# Patient Record
Sex: FEMALE | Race: WHITE | NOT HISPANIC OR LATINO | URBAN - METROPOLITAN AREA
[De-identification: names, ages, dates, MRNs, and addresses within clinical notes are randomized per-mention and may not be internally consistent; named-entity substitution may affect disease eponyms.]

---
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
## 2022-04-02 ASSESSMENT — VISUAL ACUITY
OD_SC: 20/20
OS_SC: 20/20

## 2022-04-02 ASSESSMENT — TONOMETRY
OS_IOP_MMHG: 14
OD_IOP_MMHG: 16

## 2023-03-15 ENCOUNTER — LAB OUTSIDE AN ENCOUNTER (OUTPATIENT)
Dept: URBAN - METROPOLITAN AREA CLINIC 60 | Facility: CLINIC | Age: 23
End: 2023-03-15

## 2023-03-15 ENCOUNTER — OFFICE VISIT (OUTPATIENT)
Dept: URBAN - METROPOLITAN AREA CLINIC 60 | Facility: CLINIC | Age: 23
End: 2023-03-15
Payer: COMMERCIAL

## 2023-03-15 VITALS
OXYGEN SATURATION: 98 % | BODY MASS INDEX: 20.44 KG/M2 | SYSTOLIC BLOOD PRESSURE: 106 MMHG | TEMPERATURE: 98.1 F | WEIGHT: 127.2 LBS | HEART RATE: 114 BPM | HEIGHT: 66 IN | DIASTOLIC BLOOD PRESSURE: 68 MMHG

## 2023-03-15 DIAGNOSIS — K58.2 IRRITABLE BOWEL SYNDROME WITH ALTERNATING BOWEL HABITS: ICD-10-CM

## 2023-03-15 DIAGNOSIS — R10.84 GENERALIZED ABDOMINAL PAIN: ICD-10-CM

## 2023-03-15 DIAGNOSIS — R11.0 NAUSEA: ICD-10-CM

## 2023-03-15 DIAGNOSIS — R14.0 ABDOMINAL BLOATING: ICD-10-CM

## 2023-03-15 PROBLEM — 440544005: Status: ACTIVE | Noted: 2023-03-15

## 2023-03-15 PROCEDURE — 99204 OFFICE O/P NEW MOD 45 MIN: CPT | Performed by: PHYSICIAN ASSISTANT

## 2023-03-15 RX ORDER — PROMETHAZINE HYDROCHLORIDE 12.5 MG/1
TABLET ORAL
Qty: 30 EACH | Status: ACTIVE | COMMUNITY

## 2023-03-15 RX ORDER — ONDANSETRON 8 MG/1
1 TABLET ON THE TONGUE AND ALLOW TO DISSOLVE AS NEEDED TABLET, ORALLY DISINTEGRATING ORAL ONCE A DAY
Qty: 30 | Refills: 3 | OUTPATIENT
Start: 2023-03-15

## 2023-03-15 RX ORDER — METOCLOPRAMIDE 10 MG/1
TABLET ORAL
Qty: 28 EACH | Status: ACTIVE | COMMUNITY

## 2023-03-15 RX ORDER — MECLIZINE HCL 12.5 MG 12.5 MG/1
1 TABLET AS NEEDED TABLET ORAL
Qty: 60 TABLET | Refills: 0 | OUTPATIENT
Start: 2023-03-15

## 2023-03-15 RX ORDER — ONDANSETRON 4 MG/1
TABLET, ORALLY DISINTEGRATING ORAL
Qty: 24 EACH | Status: ACTIVE | COMMUNITY

## 2023-03-15 RX ORDER — DICYCLOMINE HYDROCHLORIDE 20 MG/1
1 TABLET TABLET ORAL THREE TIMES A DAY
Qty: 90 | Refills: 3 | OUTPATIENT
Start: 2023-03-15 | End: 2023-04-14

## 2023-03-15 NOTE — HPI-TODAY'S VISIT:
22-year-old female presents to the office for evaluation of nausea.  She presents to the office today reporting constant nausea over the past 6 weeks associated with dizziness.  She is using Zofran as needed but needs refills.  She is not vomiting.  States she has been eating a bland diet but is not able to eat much because of her nausea.  Urine pregnancy test in the ER a few days ago was negative.  She has chronic multifocal abdominal cramping, belching and alternating bowel habits between constipation and diarrhea.  She is currently having constipation.  She had a bowel movement yesterday but before that had not had a bowel movement in 7 days.  Last month she was having diarrhea.  She denies any blood in the stool.  No unintentional weight loss.  Symptoms have been going on for years.   No prior EGD or colonoscopy.  No NSAID use.  Her father's cousin has Crohn's disease.  No family history of GI malignancy.  She is currently home on spring break and is requesting that she have an expedited work-up within the next 2 days.  She was recently seen in the ER in [place] on March 9, 2023 with complaints of abdominal pain and nausea.  Labs were done including CBC and CMP which were unremarkable.  CT scan of the abdomen and pelvis was done with contrast which demonstrated no acute abnormality.  She had previously been seen by GI in February 2023 in [place].  Her CBC, CMP, TSH were normal in February.  CRP was normal at 0.7.  Sed rate was normal at 9.  Celiac panel was done and was negative.  She was  prescribed Bentyl 20 mg 4 times daily as needed and Zofran to use as needed for nausea.  She has an order with her for stool studies to complete.  She previously followed with [facility].  She was last seen by them in August 2020 with complaints of chronic diarrhea which had been going on for 2 years associated with lower abdominal cramping, bloating, and excessive belching.  She reported a history of lactose intolerance.  Diarrhea would occur 4 times a month on average.  Her symptoms were thought to be functional.  Colonoscopy was discussed with the patient but she declined.  Labs, stool studies and abdominal ultrasound which were all negative/normal.  Celiac panel was done and was negative.  Stool was negative for H. pylori antigen and C. difficile.  Her ultrasound was not completed.  KUB was also ordered which was not completed.

## 2023-03-20 ENCOUNTER — OFFICE VISIT (OUTPATIENT)
Dept: URBAN - METROPOLITAN AREA MEDICAL CENTER 3 | Facility: MEDICAL CENTER | Age: 23
End: 2023-03-20
Payer: COMMERCIAL

## 2023-03-20 ENCOUNTER — TELEPHONE ENCOUNTER (OUTPATIENT)
Dept: URBAN - METROPOLITAN AREA CLINIC 63 | Facility: CLINIC | Age: 23
End: 2023-03-20

## 2023-03-20 DIAGNOSIS — R19.4 CHANGE IN BOWEL HABIT: ICD-10-CM

## 2023-03-20 DIAGNOSIS — K64.0 GRADE I HEMORRHOIDS: ICD-10-CM

## 2023-03-20 DIAGNOSIS — R11.0 NAUSEA: ICD-10-CM

## 2023-03-20 DIAGNOSIS — R14.0 ABDOMINAL BLOATING: ICD-10-CM

## 2023-03-20 DIAGNOSIS — K29.00 ACUTE GASTRITIS: ICD-10-CM

## 2023-03-20 PROCEDURE — 43239 EGD BIOPSY SINGLE/MULTIPLE: CPT | Performed by: INTERNAL MEDICINE

## 2023-03-20 PROCEDURE — 45380 COLONOSCOPY AND BIOPSY: CPT | Performed by: INTERNAL MEDICINE

## 2023-03-20 RX ORDER — METOCLOPRAMIDE 10 MG/1
TABLET ORAL
Qty: 28 EACH | Status: ACTIVE | COMMUNITY

## 2023-03-20 RX ORDER — ONDANSETRON 8 MG/1
1 TABLET ON THE TONGUE AND ALLOW TO DISSOLVE AS NEEDED TABLET, ORALLY DISINTEGRATING ORAL ONCE A DAY
Qty: 30 | Refills: 3 | Status: ACTIVE | COMMUNITY
Start: 2023-03-15

## 2023-03-20 RX ORDER — MECLIZINE HCL 12.5 MG 12.5 MG/1
1 TABLET AS NEEDED TABLET ORAL
Qty: 60 TABLET | Refills: 0 | Status: ACTIVE | COMMUNITY
Start: 2023-03-15

## 2023-03-20 RX ORDER — PROMETHAZINE HYDROCHLORIDE 12.5 MG/1
TABLET ORAL
Qty: 30 EACH | Status: ACTIVE | COMMUNITY

## 2023-03-20 RX ORDER — ONDANSETRON 4 MG/1
TABLET, ORALLY DISINTEGRATING ORAL
Qty: 24 EACH | Status: ACTIVE | COMMUNITY

## 2023-03-20 RX ORDER — DICYCLOMINE HYDROCHLORIDE 20 MG/1
1 TABLET TABLET ORAL THREE TIMES A DAY
Qty: 90 | Refills: 3 | Status: ACTIVE | COMMUNITY
Start: 2023-03-15 | End: 2023-04-14

## 2023-03-20 RX ORDER — OMEPRAZOLE 20 MG/1
1 CAPSULE 30 MINUTES BEFORE MORNING MEAL CAPSULE, DELAYED RELEASE ORAL ONCE A DAY
Qty: 30 | Refills: 1 | OUTPATIENT
Start: 2023-03-20

## 2023-03-29 ENCOUNTER — TELEPHONE ENCOUNTER (OUTPATIENT)
Dept: URBAN - METROPOLITAN AREA CLINIC 60 | Facility: CLINIC | Age: 23
End: 2023-03-29

## 2023-04-12 ENCOUNTER — OFFICE VISIT (OUTPATIENT)
Dept: URBAN - METROPOLITAN AREA CLINIC 60 | Facility: CLINIC | Age: 23
End: 2023-04-12

## 2023-04-14 ENCOUNTER — OFFICE VISIT (OUTPATIENT)
Dept: URBAN - METROPOLITAN AREA CLINIC 60 | Facility: CLINIC | Age: 23
End: 2023-04-14

## 2023-04-14 RX ORDER — OMEPRAZOLE 20 MG/1
1 CAPSULE 30 MINUTES BEFORE MORNING MEAL CAPSULE, DELAYED RELEASE ORAL ONCE A DAY
Qty: 30 | Refills: 1 | Status: ACTIVE | COMMUNITY
Start: 2023-03-20

## 2023-04-14 RX ORDER — DICYCLOMINE HYDROCHLORIDE 20 MG/1
1 TABLET TABLET ORAL THREE TIMES A DAY
Qty: 90 | Refills: 3 | Status: ACTIVE | COMMUNITY
Start: 2023-03-15 | End: 2023-04-14

## 2023-04-14 RX ORDER — ONDANSETRON 4 MG/1
TABLET, ORALLY DISINTEGRATING ORAL
Qty: 24 EACH | Status: ACTIVE | COMMUNITY

## 2023-04-14 RX ORDER — METOCLOPRAMIDE 10 MG/1
TABLET ORAL
Qty: 28 EACH | Status: ACTIVE | COMMUNITY

## 2023-04-14 RX ORDER — MECLIZINE HCL 12.5 MG 12.5 MG/1
1 TABLET AS NEEDED TABLET ORAL
Qty: 60 TABLET | Refills: 0 | Status: ACTIVE | COMMUNITY
Start: 2023-03-15

## 2023-04-14 RX ORDER — PROMETHAZINE HYDROCHLORIDE 12.5 MG/1
TABLET ORAL
Qty: 30 EACH | Status: ACTIVE | COMMUNITY

## 2023-04-14 RX ORDER — ONDANSETRON 8 MG/1
1 TABLET ON THE TONGUE AND ALLOW TO DISSOLVE AS NEEDED TABLET, ORALLY DISINTEGRATING ORAL ONCE A DAY
Qty: 30 | Refills: 3 | Status: ACTIVE | COMMUNITY
Start: 2023-03-15

## 2023-05-10 ENCOUNTER — WEB ENCOUNTER (OUTPATIENT)
Dept: URBAN - METROPOLITAN AREA CLINIC 63 | Facility: CLINIC | Age: 23
End: 2023-05-10

## 2023-05-10 ENCOUNTER — DASHBOARD ENCOUNTERS (OUTPATIENT)
Age: 23
End: 2023-05-10

## 2023-05-10 ENCOUNTER — OFFICE VISIT (OUTPATIENT)
Dept: URBAN - METROPOLITAN AREA CLINIC 60 | Facility: CLINIC | Age: 23
End: 2023-05-10

## 2023-05-10 ENCOUNTER — OFFICE VISIT (OUTPATIENT)
Dept: URBAN - METROPOLITAN AREA CLINIC 63 | Facility: CLINIC | Age: 23
End: 2023-05-10
Payer: COMMERCIAL

## 2023-05-10 VITALS
HEART RATE: 73 BPM | BODY MASS INDEX: 20.28 KG/M2 | OXYGEN SATURATION: 98 % | WEIGHT: 126.2 LBS | HEIGHT: 66 IN | SYSTOLIC BLOOD PRESSURE: 110 MMHG | DIASTOLIC BLOOD PRESSURE: 78 MMHG | TEMPERATURE: 97 F

## 2023-05-10 DIAGNOSIS — K58.2 IRRITABLE BOWEL SYNDROME WITH BOTH CONSTIPATION AND DIARRHEA: ICD-10-CM

## 2023-05-10 DIAGNOSIS — R14.0 ABDOMINAL BLOATING: ICD-10-CM

## 2023-05-10 DIAGNOSIS — R11.0 NAUSEA: ICD-10-CM

## 2023-05-10 PROBLEM — 10743008: Status: ACTIVE | Noted: 2023-05-10

## 2023-05-10 PROCEDURE — 99213 OFFICE O/P EST LOW 20 MIN: CPT | Performed by: INTERNAL MEDICINE

## 2023-05-10 RX ORDER — ONDANSETRON HYDROCHLORIDE 4 MG/1
1 TABLET TABLET, FILM COATED ORAL
Qty: 90 | Refills: 3 | OUTPATIENT
Start: 2023-05-10

## 2023-05-10 RX ORDER — ONDANSETRON 4 MG/1
TABLET, ORALLY DISINTEGRATING ORAL
Qty: 24 EACH | Status: ACTIVE | COMMUNITY

## 2023-05-10 RX ORDER — OMEPRAZOLE 20 MG/1
1 CAPSULE 30 MINUTES BEFORE MORNING MEAL CAPSULE, DELAYED RELEASE ORAL ONCE A DAY
Qty: 30 | Refills: 1 | Status: ACTIVE | COMMUNITY
Start: 2023-03-20

## 2023-05-10 NOTE — HPI-TODAY'S VISIT:
Ivana is a 22-year-old female.  She is accompanied by her mother. Her mother was a pediatrician in [facility]. Initially seen in our office 03/15/2023. At that time, she had several GI complaints.  She complained of nausea, abdominal pain, belching, and alternating bowel habits with constipation and diarrhea.  She was evaluated in the emergency room in [place] where she was attending school.  Work-up from the emergency room included labs and CT scan which were unremarkable.  She was also evaluated by gastroenterologist earlier this year in [place].  She had labs at that time as well.  These records were reviewed and her labs were unremarkable including a normal CBC, CMP, TSH, CRP, sed rate, and celiac panel.  She underwent recent EGD and colonoscopy with one of my partners 03/20/2023.  EGD revealed mild gastritis but was otherwise unremarkable.  Biopsies negative for H. pylori and celiac disease.  Colonoscopy revealed a normal-appearing colon and terminal ileum.  Biopsies negative for microscopic colitis or inflammatory bowel disease. She says that she has nausea. She is unable to eat much due to the nauea and feeling full. She does not always have a great appetite. Denies abdominal pain. She does complain of bloating which is worse after eating. Recently, she has had more constipation than the diarrhea. She is leaving on Friday to go to [place] for the summer with her mother.

## 2023-05-11 ENCOUNTER — OFFICE VISIT (OUTPATIENT)
Dept: URBAN - METROPOLITAN AREA CLINIC 63 | Facility: CLINIC | Age: 23
End: 2023-05-11

## 2025-03-22 NOTE — PHYSICAL EXAM CONSTITUTIONAL:
well developed, well nourished , in no acute distress , ambulating without difficulty , normal communication ability
None